# Patient Record
Sex: FEMALE | Race: WHITE | URBAN - METROPOLITAN AREA
[De-identification: names, ages, dates, MRNs, and addresses within clinical notes are randomized per-mention and may not be internally consistent; named-entity substitution may affect disease eponyms.]

---

## 2017-01-05 ENCOUNTER — TRANSFERRED RECORDS (OUTPATIENT)
Dept: HEALTH INFORMATION MANAGEMENT | Facility: CLINIC | Age: 27
End: 2017-01-05

## 2017-01-05 LAB
C TRACH DNA SPEC QL PROBE+SIG AMP: NEGATIVE
CHOLEST SERPL-MCNC: 199 MG/DL (ref 100–199)
HDLC SERPL-MCNC: 74 MG/DL
LDLC SERPL CALC-MCNC: 112 MG/DL
N GONORRHOEA DNA SPEC QL PROBE+SIG AMP: NEGATIVE
NONHDLC SERPL-MCNC: 125 MG/DL
PAP-ABSTRACT: NORMAL
SPECIMEN DESCRIP: NORMAL
SPECIMEN DESCRIPTION: NORMAL
TRIGL SERPL-MCNC: 63 MG/DL
TSH SERPL-ACNC: 1.02 UIU/ML (ref 0.35–4.94)

## 2017-10-24 ENCOUNTER — RECORDS - HEALTHEAST (OUTPATIENT)
Dept: ADMINISTRATIVE | Facility: OTHER | Age: 27
End: 2017-10-24

## 2017-10-28 ENCOUNTER — RADIANT APPOINTMENT (OUTPATIENT)
Dept: GENERAL RADIOLOGY | Facility: CLINIC | Age: 27
End: 2017-10-28
Payer: COMMERCIAL

## 2017-10-28 ENCOUNTER — OFFICE VISIT (OUTPATIENT)
Dept: URGENT CARE | Facility: URGENT CARE | Age: 27
End: 2017-10-28
Payer: COMMERCIAL

## 2017-10-28 VITALS
TEMPERATURE: 99.1 F | HEIGHT: 63 IN | DIASTOLIC BLOOD PRESSURE: 60 MMHG | SYSTOLIC BLOOD PRESSURE: 118 MMHG | BODY MASS INDEX: 24.45 KG/M2 | OXYGEN SATURATION: 100 % | HEART RATE: 81 BPM | WEIGHT: 138 LBS

## 2017-10-28 DIAGNOSIS — M25.561 ACUTE PAIN OF RIGHT KNEE: ICD-10-CM

## 2017-10-28 DIAGNOSIS — M70.41 PREPATELLAR BURSITIS OF RIGHT KNEE: ICD-10-CM

## 2017-10-28 DIAGNOSIS — M25.561 ACUTE PAIN OF RIGHT KNEE: Primary | ICD-10-CM

## 2017-10-28 PROCEDURE — 73562 X-RAY EXAM OF KNEE 3: CPT | Mod: RT

## 2017-10-28 PROCEDURE — 99203 OFFICE O/P NEW LOW 30 MIN: CPT | Performed by: FAMILY MEDICINE

## 2017-10-28 ASSESSMENT — ENCOUNTER SYMPTOMS
CONSTIPATION: 0
DIARRHEA: 0
DIZZINESS: 0
DYSURIA: 0
CHILLS: 0
HEADACHES: 0
NAUSEA: 0
SHORTNESS OF BREATH: 0
VOMITING: 0
ABDOMINAL PAIN: 0
FEVER: 0
COUGH: 0

## 2017-10-28 NOTE — NURSING NOTE
"Sejal Marks;   Chief Complaint   Patient presents with     Knee Pain     tripped and fell on right knee cap, on pavement, painful to put pressure on it and bend it      Urgent Care     Initial /60 (BP Location: Left arm, Patient Position: Chair, Cuff Size: Adult Regular)  Pulse 81  Temp 99.1  F (37.3  C) (Oral)  Ht 5' 3\" (1.6 m)  Wt 138 lb (62.6 kg)  SpO2 100%  BMI 24.45 kg/m2 Estimated body mass index is 24.45 kg/(m^2) as calculated from the following:    Height as of this encounter: 5' 3\" (1.6 m).    Weight as of this encounter: 138 lb (62.6 kg)..  BP completed using cuff size regular.  Ashley Gupta R.N.  "

## 2017-10-28 NOTE — MR AVS SNAPSHOT
After Visit Summary   10/28/2017    Sejal Marks    MRN: [id number]           Patient Information     Date Of Birth          1990        Visit Information        Provider Department      10/28/2017 6:15 PM Carlos Da Silva MD [facility]        Today's Diagnoses     Acute pain of right knee    -  1    Prepatellar bursitis of right knee          Care Instructions      Understanding Prepatellar Bursitis    A bursa is a thin, slippery, sac-like film. It contains a small amount of fluid. This structure is found between bones and soft tissues in and around joints. A bursa cushions and protects a joint. It keeps parts of a joint from rubbing against each other.  The prepatellar bursa is found on top of the kneecap (patella). It lies just under the skin. If this bursa becomes irritated and inflamed, the condition is called prepatellar bursitis.  Causes of prepatellar bursitis  A common cause of this problem is repeated kneeling on the floor. For this reason, it is sometimes called  s knee. Injury from a blow to your kneecap can also cause it. Running on uneven ground may also play a role.  Symptoms of prepatellar bursitis  These may include:    Knee pain that gets worse with bending or pressure to the knee, and gets better with rest    Swelling over the kneecap    Tenderness or warmth over the kneecap    Crackling sound from the kneecap with movement  Treatment for prepatellar bursitis  This problem often gets better with rest and medicines. Other treatments may be needed. Possible treatments include:    Resting your knee. This allows the area to heal. It includes avoiding things that trigger symptoms.    Prescription or over-the-counter pain medicines. These help reduce pain and swelling.    Stretching and strengthening exercises. These help improve the strength and flexibility of the muscles around the knee.    Cold packs or heat packs. These help  reduce pain and swelling.    Physical therapy. This may include exercises, ultrasound, or other treatments.    Kneepads. These help protect your knees during sports.    Injection of medicine into the bursa or drainage of fluid from the bursa. These may help relieve symptoms.  For symptoms that don t get better with these treatments, you may need surgery to remove the bursa.  Possible complications    If germs get into the bursa through a cut in your skin, the bursa may become infected. An infection is generally treated with antibiotic medicine. In some cases, the infected bursa must be removed.    If your knee isn t given time to heal, this problem may become long-term (chronic). This can lead to trouble moving the knee joint.     When to call your healthcare provider  Call your healthcare provider right away if you have:    Fever of 100.4 F (38 C) or higher, or as directed    Increased swelling or warmth of the area, or drainage from the area    Symptoms that don t get better or get worse    New symptoms   Date Last Reviewed: 3/10/2016    8410-7090 [employer]. [address] [place] [address]. All rights reserved. This information is not intended as a substitute for professional medical care. Always follow your healthcare professional's instructions.                Follow-ups after your visit        Who to contact     If you have questions or need follow up information about today's clinic visit or your schedule please contact [facility] directly at [phone].  Normal or non-critical lab and imaging results will be communicated to you by MyChart, letter or phone within 4 business days after the clinic has received the results. If you do not hear from us within 7 days, please contact the clinic through MyChart or phone. If you have a critical or abnormal lab result, we will notify you by phone as soon as possible.  Submit refill requests through [employer] or call your  "pharmacy and they will forward the refill request to us. Please allow 3 business days for your refill to be completed.          Additional Information About Your Visit        MyChart Information     [employer] lets you send messages to your doctor, view your test results, renew your prescriptions, schedule appointments and more. To sign up, go to [email or web address] . Click on \"Log in\" on the left side of the screen, which will take you to the Welcome page. Then click on \"Sign up Now\" on the right side of the page.     You will be asked to enter the access code listed below, as well as some personal information. Please follow the directions to create your username and password.     Your access code is: [email or web address]  Expires: 2018  7:22 PM     Your access code will  in 90 days. If you need help or a new code, please call your [place] clinic or [phone].        Care EveryWhere ID     This is your Care EveryWhere ID. This could be used by other organizations to access your [facility] medical records  [id number]        Your Vitals Were     Pulse Temperature Height Pulse Oximetry BMI (Body Mass Index)       81 99.1  F (37.3  C) (Oral) 5' 3\" (1.6 m) 100% 24.45 kg/m2        Blood Pressure from Last 3 Encounters:   10/28/17 118/60    Weight from Last 3 Encounters:   10/28/17 138 lb (62.6 kg)                 Today's Medication Changes          These changes are accurate as of: 10/28/17  7:22 PM.  If you have any questions, ask your nurse or doctor.               Start taking these medicines.        Dose/Directions    * order for DME   Used for:  Acute pain of right knee, Prepatellar bursitis of right knee   Started by:  Carlos Da Silva MD        One set of crutches   Quantity:  1 Device   Refills:  0       * order for DME   Used for:  Acute pain of right knee, Prepatellar bursitis of right knee   Started by:  Carlos Da Silva MD        One knee sleeve for right knee   Quantity:  1 Device   Refills:  0 "       * Notice:  This list has 2 medication(s) that are the same as other medications prescribed for you. Read the directions carefully, and ask your doctor or other care provider to review them with you.         Where to get your medicines      Some of these will need a paper prescription and others can be bought over the counter.  Ask your nurse if you have questions.     Bring a paper prescription for each of these medications     order for DME    order for DME                Primary Care Provider Office Phone # Fax #    Anne Ley [phone]       [facility] [address] [place] [address]        Equal Access to Services     [facility]: Hadii aad ku hadasho Soomaali, waaxda luqadaha, qaybta kaalmada aj, thang chu . So [facility] [phone].    ATENCIÓN: Si habla español, tiene a muro disposición servicios gratuitos de asistencia lingüística. [facility] [phone].    We comply with applicable federal civil rights laws and [place] laws. We do not discriminate on the basis of race, color, national origin, age, disability, sex, sexual orientation, or gender identity.            Thank you!     Thank you for choosing [facility]  for your care. Our goal is always to provide you with excellent care. Hearing back from our patients is one way we can continue to improve our services. Please take a few minutes to complete the written survey that you may receive in the mail after your visit with us. Thank you!             Your Updated Medication List - Protect others around you: Learn how to safely use, store and throw away your medicines at www.disposemymeds.org.          This list is accurate as of: 10/28/17  7:22 PM.  Always use your most recent med list.                   Brand Name Dispense Instructions for use Diagnosis    LAMOTRIGINE PO      Take 200 mg by mouth daily        * order for DME     1 Device    One set of  crutches    Acute pain of right knee, Prepatellar bursitis of right knee       * order for DME     1 Device    One knee sleeve for right knee    Acute pain of right knee, Prepatellar bursitis of right knee       ZOLOFT PO      Take 100 mg by mouth daily        * Notice:  This list has 2 medication(s) that are the same as other medications prescribed for you. Read the directions carefully, and ask your doctor or other care provider to review them with you.

## 2017-10-28 NOTE — PROGRESS NOTES
SUBJECTIVE:  Chief Complaint   Patient presents with     Knee Pain     tripped and fell on right knee cap, on pavement, painful to put pressure on it and bend it      Urgent Care     Sejal Marks is a 26 year old female presents with a chief complaint of right knee pain, swelling, tenderness, redness and decreased range of motion.  The injury occurred 3 hours(s) ago.   The injury happened while while walking. How: fall , from standing tripped on sidewalk at uneven portion immediate pain, immediate swelling, inability to bear weight directly after injury.  The patient complained of moderate pain  and has had decreased ROM.  Pain exacerbated by walking.  Relieved by rest and elevation.  She treated it initially with no therapy pt came straight here. This is the first time this type of injury has occurred to this patient.        HO fx hand and arm age 10, 14 falls/snowboarding     PMH: epilepsy on lamotrigine EC last seizure 8yrs ago, anxiety/depression on zoloft  PSH: none  Allergies: None    Meds: Lamictal 400 mg daily, Zolofot    No past medical history on file.  Current Outpatient Prescriptions   Medication Sig Dispense Refill     LAMOTRIGINE PO Take 200 mg by mouth daily       Sertraline HCl (ZOLOFT PO) Take 100 mg by mouth daily       Social History   Substance Use Topics     Smoking status: Never Smoker     Smokeless tobacco: Never Used     Alcohol use Not on file       ROS:  Review of Systems   Constitutional: Negative for chills and fever.   Respiratory: Negative for cough and shortness of breath.    Cardiovascular: Negative for chest pain.   Gastrointestinal: Negative for abdominal pain, constipation, diarrhea, nausea and vomiting.   Genitourinary: Negative for dysuria.   Musculoskeletal: Positive for joint pain.        Ant knee pain   Skin: Negative for rash.   Neurological: Negative for dizziness and headaches.     EXAM:   /60 (BP Location: Left arm, Patient Position: Chair, Cuff Size:  "Adult Regular)  Pulse 81  Temp 99.1  F (37.3  C) (Oral)  Ht 5' 3\" (1.6 m)  Wt 138 lb (62.6 kg)  SpO2 100%  BMI 24.45 kg/m2  Gen: healthy, alert, active, mild distress and healthy,alert,no distress  Extremity: knee has swelling over ant patella, point tenderness over patella and pain with flexion over anterior patella, pt able to get full extension though painful, special testing limited 2/2 pain.  .   There is not compromise to the distal circulation.  Pulses are +2 and CRT is brisk  GENERAL APPEARANCE: healthy, alert and no distress  EXTREMITIES: peripheral pulses normal  SKIN: no suspicious lesions or rashes  NEURO: Normal strength and tone, sensory exam grossly normal, mentation intact and speech normal    X-RAY was done.  No acute fracture noted with radiology read pending    ASSESSMENT:   Right Knee Pain: Likely Contusion and burstitis of the patella, significantly limiting ROM and pt having difficulty ambulating.  No fx noted on XR.  Given this knee sleeve and crutches were given for comfort.  She was encouraged to ice, take ibuprofen/tyelnol for pain and f/u in 1 week if sx worsen/do not improve.  Passive ROM was encouraged  Sejal was seen today for knee pain and urgent care.    Diagnoses and all orders for this visit:    Acute pain of right knee  -     XR Knee Right 3 Views; Future  -     order for DME; One set of crutches  -     order for DME; One knee sleeve for right knee    Prepatellar bursitis of right knee  -     order for DME; One set of crutches  -     order for DME; One knee sleeve for right knee      PLAN:  1) Rest, Ice, Compress, Elevate and Ibuprofen/tylenol q 6 hrs for 3-5 days    aCrlos Da Silva      "

## 2017-10-29 NOTE — PATIENT INSTRUCTIONS
Understanding Prepatellar Bursitis    A bursa is a thin, slippery, sac-like film. It contains a small amount of fluid. This structure is found between bones and soft tissues in and around joints. A bursa cushions and protects a joint. It keeps parts of a joint from rubbing against each other.  The prepatellar bursa is found on top of the kneecap (patella). It lies just under the skin. If this bursa becomes irritated and inflamed, the condition is called prepatellar bursitis.  Causes of prepatellar bursitis  A common cause of this problem is repeated kneeling on the floor. For this reason, it is sometimes called  s knee. Injury from a blow to your kneecap can also cause it. Running on uneven ground may also play a role.  Symptoms of prepatellar bursitis  These may include:    Knee pain that gets worse with bending or pressure to the knee, and gets better with rest    Swelling over the kneecap    Tenderness or warmth over the kneecap    Crackling sound from the kneecap with movement  Treatment for prepatellar bursitis  This problem often gets better with rest and medicines. Other treatments may be needed. Possible treatments include:    Resting your knee. This allows the area to heal. It includes avoiding things that trigger symptoms.    Prescription or over-the-counter pain medicines. These help reduce pain and swelling.    Stretching and strengthening exercises. These help improve the strength and flexibility of the muscles around the knee.    Cold packs or heat packs. These help reduce pain and swelling.    Physical therapy. This may include exercises, ultrasound, or other treatments.    Kneepads. These help protect your knees during sports.    Injection of medicine into the bursa or drainage of fluid from the bursa. These may help relieve symptoms.  For symptoms that don t get better with these treatments, you may need surgery to remove the bursa.  Possible complications    If germs get into the bursa  through a cut in your skin, the bursa may become infected. An infection is generally treated with antibiotic medicine. In some cases, the infected bursa must be removed.    If your knee isn t given time to heal, this problem may become long-term (chronic). This can lead to trouble moving the knee joint.     When to call your healthcare provider  Call your healthcare provider right away if you have:    Fever of 100.4 F (38 C) or higher, or as directed    Increased swelling or warmth of the area, or drainage from the area    Symptoms that don t get better or get worse    New symptoms   Date Last Reviewed: 3/10/2016    5211-6255 [employer]. [address] [place] [address], [place] [address]. All rights reserved. This information is not intended as a substitute for professional medical care. Always follow your healthcare professional's instructions.

## 2017-11-10 ENCOUNTER — NURSE TRIAGE (OUTPATIENT)
Dept: NURSING | Facility: CLINIC | Age: 27
End: 2017-11-10

## 2018-06-01 ENCOUNTER — OFFICE VISIT (OUTPATIENT)
Dept: FAMILY MEDICINE | Facility: CLINIC | Age: 28
End: 2018-06-01
Payer: COMMERCIAL

## 2018-06-01 VITALS
WEIGHT: 141 LBS | TEMPERATURE: 97.5 F | BODY MASS INDEX: 24.98 KG/M2 | DIASTOLIC BLOOD PRESSURE: 68 MMHG | HEART RATE: 84 BPM | SYSTOLIC BLOOD PRESSURE: 109 MMHG | RESPIRATION RATE: 18 BRPM | HEIGHT: 63 IN

## 2018-06-01 DIAGNOSIS — F33.0 MAJOR DEPRESSIVE DISORDER, RECURRENT EPISODE, MILD (H): ICD-10-CM

## 2018-06-01 DIAGNOSIS — Z00.00 WELL FEMALE EXAM WITHOUT GYNECOLOGICAL EXAM: Primary | ICD-10-CM

## 2018-06-01 PROBLEM — Z13.6 CARDIOVASCULAR SCREENING; LDL GOAL LESS THAN 160: Status: ACTIVE | Noted: 2018-06-01

## 2018-06-01 PROCEDURE — 99385 PREV VISIT NEW AGE 18-39: CPT | Performed by: NURSE PRACTITIONER

## 2018-06-01 RX ORDER — ALPRAZOLAM 0.5 MG
0.5 TABLET ORAL
COMMUNITY
Start: 2018-01-25

## 2018-06-01 RX ORDER — SERTRALINE HYDROCHLORIDE 100 MG/1
100 TABLET, FILM COATED ORAL DAILY
Qty: 90 TABLET | Refills: 3 | Status: SHIPPED | OUTPATIENT
Start: 2018-06-01

## 2018-06-01 NOTE — PROGRESS NOTES
SUBJECTIVE:   CC: Sejal Marks is an 27 year old woman who presents for preventive health visit.     Physical   Annual:     Getting at least 3 servings of Calcium per day::  Yes    Bi-annual eye exam::  NO    Dental care twice a year::  NO    Sleep apnea or symptoms of sleep apnea::  None    Diet::  Vegetarian/vegan    Frequency of exercise::  2-3 days/week    Duration of exercise::  30-45 minutes    Taking medications regularly::  Yes    Medication side effects::  None    Additional concerns today::  No            Today's PHQ-2 Score:   PHQ-2 ( 1999 [employer]) 6/1/2018   Q1: Little interest or pleasure in doing things 1   Q2: Feeling down, depressed or hopeless 0   PHQ-2 Score 1   Q1: Little interest or pleasure in doing things Several days   Q2: Feeling down, depressed or hopeless Not at all   PHQ-2 Score 1       Abuse: Current or Past(Physical, Sexual or Emotional)- No  Do you feel safe in your environment - Yes    Social History   Substance Use Topics     Smoking status: Never Smoker     Smokeless tobacco: Never Used     Alcohol use Not on file     Alcohol Use 6/1/2018   If you drink alcohol do you typically have greater than 3 drinks per day OR greater than 7 drinks per week? No     Reviewed orders with patient.  Reviewed health maintenance and updated orders accordingly - Yes    Mammogram not appropriate for this patient based on age.    Pertinent mammograms are reviewed under the imaging tab.  History of abnormal Pap smear: NO - age 21-29 PAP every 3 years recommended    Reviewed and updated as needed this visit by clinical staff  Allergies  Meds  Problems  Med Hx  Surg Hx  Fam Hx         Reviewed and updated as needed this visit by Provider  Allergies  Meds  Problems  Med Hx  Surg Hx  Fam Hx            Review of Systems  CONSTITUTIONAL: NEGATIVE for fever, chills, change in weight  INTEGUMENTARU/SKIN: NEGATIVE for worrisome rashes, moles or lesions  EYES: NEGATIVE for vision changes  "or irritation  ENT: NEGATIVE for ear, mouth and throat problems  RESP: NEGATIVE for significant cough or SOB  BREAST: NEGATIVE for masses, tenderness or discharge  CV: NEGATIVE for chest pain, palpitations or peripheral edema  GI: NEGATIVE for nausea, abdominal pain, heartburn, or change in bowel habits  : NEGATIVE for unusual urinary or vaginal symptoms. Periods are regular.  MUSCULOSKELETAL: NEGATIVE for significant arthralgias or myalgia  NEURO: NEGATIVE for weakness, dizziness or paresthesias  PSYCHIATRIC: NEGATIVE for changes in mood or affect     OBJECTIVE:   /68  Pulse 84  Temp 97.5  F (36.4  C) (Oral)  Resp 18  Ht 5' 3.25\" (1.607 m)  Wt 141 lb (64 kg)  LMP 05/19/2018 (Exact Date)  BMI 24.78 kg/m2  Physical Exam  GENERAL: healthy, alert and no distress  EYES: Eyes grossly normal to inspection, PERRL and conjunctivae and sclerae normal  HENT: ear canals and TM's normal, nose and mouth without ulcers or lesions  NECK: no adenopathy, no asymmetry, masses, or scars and thyroid normal to palpation  RESP: lungs clear to auscultation - no rales, rhonchi or wheezes  BREAST: normal without masses, tenderness or nipple discharge and no palpable axillary masses or adenopathy  CV: regular rate and rhythm, normal S1 S2, no S3 or S4, no murmur, click or rub, no peripheral edema and peripheral pulses strong  ABDOMEN: soft, nontender, no hepatosplenomegaly, no masses and bowel sounds normal  MS: no gross musculoskeletal defects noted, no edema  SKIN: no suspicious lesions or rashes  NEURO: Normal strength and tone, mentation intact and speech normal  PSYCH: mentation appears normal, affect normal/bright  PHQ +3/27    ASSESSMENT/PLAN:       ICD-10-CM    1. Well female exam without gynecological exam Z00.00    2. Major depressive disorder, recurrent episode, mild (H) F33.0 sertraline (ZOLOFT) 100 MG tablet      well female, not fasting for labs.  Encouraged to continue exercise and healthy diet choices.  " "    Depression stable on sertraline 100 mg daily.  F/u 1 year or sooner if worsening   COUNSELING:  Reviewed preventive health counseling, as reflected in patient instructions         reports that she has never smoked. She has never used smokeless tobacco.    Estimated body mass index is 24.45 kg/(m^2) as calculated from the following:    Height as of 10/28/17: 5' 3\" (1.6 m).    Weight as of 10/28/17: 138 lb (62.6 kg).       Counseling Resources:  ATP IV Guidelines  Pooled Cohorts Equation Calculator  Breast Cancer Risk Calculator  FRAX Risk Assessment  ICSI Preventive Guidelines  Dietary Guidelines for Americans, 2010  USDA's MyPlate  ASA Prophylaxis  Lung CA Screening    RADHA Jason CNP  [facility]  Answers for HPI/ROS submitted by the patient on 6/1/2018   PHQ-2 Score: 1    "

## 2018-06-01 NOTE — MR AVS SNAPSHOT
After Visit Summary   6/1/2018    Sejal Marks    MRN: [id number]           Patient Information     Date Of Birth          1990        Visit Information        Provider Department      6/1/2018 10:40 AM Naomi Gray APRN CNP [facility]        Today's Diagnoses     Well female exam without gynecological exam    -  1    Major depressive disorder, recurrent episode, mild (H)          Care Instructions      Preventive Health Recommendations  Female Ages 18 to 25     Yearly exam:     See your health care provider every year in order to  o Review health changes.   o Discuss preventive care.    o Review your medicines if your doctor has prescribed any.      You should be tested each year for STDs (sexually transmitted diseases).       After age 20, talk to your provider about how often you should have cholesterol testing.      Starting at age 21, get a Pap test every three years. If you have an abnormal result, your doctor may have you test more often.      If you are at risk for diabetes, you should have a diabetes test (fasting glucose).     Shots:     Get a flu shot each year.     Get a tetanus shot every 10 years.     Consider getting the shot (vaccine) that prevents cervical cancer (Gardasil).    Nutrition:     Eat at least 5 servings of fruits and vegetables each day.    Eat whole-grain bread, whole-wheat pasta and brown rice instead of white grains and rice.    Talk to your provider about Calcium and Vitamin D.     Lifestyle    Exercise at least 150 minutes a week each week (30 minutes a day, 5 days a week). This will help you control your weight and prevent disease.    Limit alcohol to one drink per day.    No smoking.     Wear sunscreen to prevent skin cancer.    See your dentist every six months for an exam and cleaning.          Follow-ups after your visit        Who to contact     If you have questions or need follow up information about today's  "clinic visit or your schedule please contact [facility] directly at [phone].  Normal or non-critical lab and imaging results will be communicated to you by MyChart, letter or phone within 4 business days after the clinic has received the results. If you do not hear from us within 7 days, please contact the clinic through [employer] or phone. If you have a critical or abnormal lab result, we will notify you by phone as soon as possible.  Submit refill requests through [employer] or call your pharmacy and they will forward the refill request to us. Please allow 3 business days for your refill to be completed.          Additional Information About Your Visit        MyChart Information     [employer] lets you send messages to your doctor, view your test results, renew your prescriptions, schedule appointments and more. To sign up, go to [email or web address] . Click on \"Log in\" on the left side of the screen, which will take you to the Welcome page. Then click on \"Sign up Now\" on the right side of the page.     You will be asked to enter the access code listed below, as well as some personal information. Please follow the directions to create your username and password.     Your access code is: [email or web address]  Expires: 2018 11:05 AM     Your access code will  in 90 days. If you need help or a new code, please call your [place] clinic or [phone].        Care EveryWhere ID     This is your Care EveryWhere ID. This could be used by other organizations to access your [place] medical records  [id number]        Your Vitals Were     Pulse Temperature Respirations Height Last Period BMI (Body Mass Index)    84 97.5  F (36.4  C) (Oral) 18 5' 3.25\" (1.607 m) 2018 (Exact Date) 24.78 kg/m2       Blood Pressure from Last 3 Encounters:   18 109/68   10/28/17 118/60    Weight from Last 3 Encounters:   18 141 lb (64 kg)   10/28/17 138 lb (62.6 kg)              Today, you had the " following     No orders found for display         Today's Medication Changes          These changes are accurate as of 6/1/18 11:05 AM.  If you have any questions, ask your nurse or doctor.               These medicines have changed or have updated prescriptions.        Dose/Directions    sertraline 100 MG tablet   Commonly known as:  ZOLOFT   This may have changed:  medication strength   Used for:  Major depressive disorder, recurrent episode, mild (H)   Changed by:  Naomi Gray APRN CNP        Dose:  100 mg   Take 1 tablet (100 mg) by mouth daily   Quantity:  90 tablet   Refills:  3            Where to get your medicines      These medications were sent to [facility] - [place], [place] - [address]  [address] [facility] [address]     Phone:  [phone]     sertraline 100 MG tablet                Primary Care Provider Office Phone # Fax #    Anne Ley [phone]       [facility] [address] [place] [address]        Equal Access to Services     ALEXIS BARTH AH: Hadii aad ku hadasho Soomaali, waaxda luqadaha, qaybta kaalmada adeegyada, waxay idiin haymamadou chu . So [facility] [phone].    ATENCIÓN: Si habla español, tiene a muro disposición servicios gratuitos de asistencia lingüística. [facility] [phone].    We comply with applicable federal civil rights laws and [place] laws. We do not discriminate on the basis of race, color, national origin, age, disability, sex, sexual orientation, or gender identity.            Thank you!     Thank you for choosing [facility]  for your care. Our goal is always to provide you with excellent care. Hearing back from our patients is one way we can continue to improve our services. Please take a few minutes to complete the written survey that you may receive in the mail after your visit with us. Thank you!             Your Updated Medication List - Protect others around  you: Learn how to safely use, store and throw away your medicines at www.disposemymeds.org.          This list is accurate as of 6/1/18 11:05 AM.  Always use your most recent med list.                   Brand Name Dispense Instructions for use Diagnosis    ALPRAZolam 0.5 MG tablet    XANAX     Take 0.5 mg by mouth        LAMOTRIGINE PO      Take 200 mg by mouth daily        sertraline 100 MG tablet    ZOLOFT    90 tablet    Take 1 tablet (100 mg) by mouth daily    Major depressive disorder, recurrent episode, mild (H)

## 2018-06-01 NOTE — Clinical Note
Please abstract the following data from this visit with this patient into the appropriate field in Epic:  Pap smear done on this date: 2017 (approximately), by this group: [facility], results were normal .

## 2018-06-28 ENCOUNTER — OFFICE VISIT (OUTPATIENT)
Dept: OBGYN | Facility: CLINIC | Age: 28
End: 2018-06-28
Attending: OBSTETRICS & GYNECOLOGY
Payer: COMMERCIAL

## 2018-06-28 VITALS
BODY MASS INDEX: 25.48 KG/M2 | SYSTOLIC BLOOD PRESSURE: 116 MMHG | WEIGHT: 143.8 LBS | DIASTOLIC BLOOD PRESSURE: 76 MMHG | HEART RATE: 105 BPM | HEIGHT: 63 IN

## 2018-06-28 DIAGNOSIS — Z30.430 ENCOUNTER FOR IUD INSERTION: Primary | ICD-10-CM

## 2018-06-28 DIAGNOSIS — Z01.812 PRE-PROCEDURAL LABORATORY EXAMINATION: ICD-10-CM

## 2018-06-28 DIAGNOSIS — Z30.432 ENCOUNTER FOR IUD REMOVAL: ICD-10-CM

## 2018-06-28 LAB
HCG UR QL: NEGATIVE
INTERNAL QC OK POCT: YES

## 2018-06-28 PROCEDURE — 25000125 ZZHC RX 250: Mod: ZF

## 2018-06-28 PROCEDURE — G0463 HOSPITAL OUTPT CLINIC VISIT: HCPCS | Mod: ZF

## 2018-06-28 PROCEDURE — 58301 REMOVE INTRAUTERINE DEVICE: CPT | Mod: ZF | Performed by: OBSTETRICS & GYNECOLOGY

## 2018-06-28 PROCEDURE — 58300 INSERT INTRAUTERINE DEVICE: CPT | Mod: ZF | Performed by: OBSTETRICS & GYNECOLOGY

## 2018-06-28 PROCEDURE — 40000269 ZZH STATISTIC NO CHARGE FACILITY FEE

## 2018-06-28 PROCEDURE — 81025 URINE PREGNANCY TEST: CPT | Mod: ZF | Performed by: OBSTETRICS & GYNECOLOGY

## 2018-06-28 NOTE — MR AVS SNAPSHOT
After Visit Summary   6/28/2018    Sejal Marks    MRN: [id number]           Patient Information     Date Of Birth          1990        Visit Information        Provider Department      6/28/2018 10:00 AM Gemma Pérez MD [facility]        Today's Diagnoses     Encounter for IUD insertion    -  1    Pre-procedural laboratory examination        Encounter for IUD removal          Care Instructions      IUD pamphlet reviewed and given to patient.          Follow-ups after your visit        Your next 10 appointments already scheduled     Jul 26, 2018 10:00 AM CDT   Return Visit with Gemma Pérez MD   [facility] ([facility])    [place] [facility] [address]  [address] [facility] [id number] [phone]            Aug 07, 2018 11:40 AM CDT   (Arrive by 11:25 AM)   NEW FOOT/ANKLE with Bertin Espitia [facility] ([facility])    [address] [facility]  [address] [facility] [address] [phone]              Who to contact     Please call your clinic at [phone] to:    Ask questions about your health    Make or cancel appointments    Discuss your medicines    Learn about your test results    Speak to your doctor            Additional Information About Your Visit        MyChart Information     PROSimityt is an electronic gateway that provides easy, online access to your medical records. With luma-id, you can request a clinic appointment, read your test results, renew a prescription or communicate with your care team.     To sign up for [employer]imityt visit the website at [email or web address]   You will be asked to enter the access code listed below, as well as some personal information. Please follow the directions to create your username and password.     Your access code is: [email or web address]  Expires: 8/30/2018 11:05 AM    "  Your access code will  in 90 days. If you need help or a new code, please contact your [facility] or call [phone] for assistance.        Care EveryWhere ID     This is your Care EveryWhere ID. This could be used by other organizations to access your [facility] medical records  [id number]        Your Vitals Were     Pulse Height Last Period BMI (Body Mass Index)          105 1.6 m (5' 3\") 06/15/2018 25.47 kg/m2         Blood Pressure from Last 3 Encounters:   No data found for BP    Weight from Last 3 Encounters:   No data found for Wt              Today, you had the following     No orders found for display         Today's Medication Changes          These changes are accurate as of 18 11:59 PM.  If you have any questions, ask your nurse or doctor.               Start taking these medicines.        Dose/Directions    levonorgestrel 20 MCG/24HR IUD   Commonly known as:  MIRENA (52 MG)   Used for:  Encounter for IUD insertion   Started by:  Gemma Pérez MD        Dose:  1 each   1 each (20 mcg) by Intrauterine route once for 1 dose   Quantity:  1 each   Refills:  0            Where to get your medicines      Some of these will need a paper prescription and others can be bought over the counter.  Ask your nurse if you have questions.     You don't need a prescription for these medications     levonorgestrel 20 MCG/24HR IUD                Primary Care Provider Office Phone # Fax #    Anne Ley [phone]       [facility] [address] [place] [address]        Equal Access to Services     [facility] LARY AH: Hadii nini ku hadasho Soomaali, waaxda luqadaha, qaybta kaalmada adeegyada, thang ramirez. So [facility] [phone].    ATENCIÓN: Si habla español, tiene a muro disposición servicios gratuitos de asistencia lingüística. Llame al [phone].    We comply with applicable federal civil rights laws " and [place] laws. We do not discriminate on the basis of race, color, national origin, age, disability, sex, sexual orientation, or gender identity.            Thank you!     Thank you for choosing [facility]  for your care. Our goal is always to provide you with excellent care. Hearing back from our patients is one way we can continue to improve our services. Please take a few minutes to complete the written survey that you may receive in the mail after your visit with us. Thank you!             Your Updated Medication List - Protect others around you: Learn how to safely use, store and throw away your medicines at www.disposemymeds.org.          This list is accurate as of 6/28/18 11:59 PM.  Always use your most recent med list.                   Brand Name Dispense Instructions for use Diagnosis    ALPRAZolam 0.5 MG tablet    XANAX     Take 0.5 mg by mouth        LAMOTRIGINE PO      Take 200 mg by mouth daily        levonorgestrel 20 MCG/24HR IUD    MIRENA (52 MG)    1 each    1 each (20 mcg) by Intrauterine route once for 1 dose    Encounter for IUD insertion       sertraline 100 MG tablet    ZOLOFT    90 tablet    Take 1 tablet (100 mg) by mouth daily    Major depressive disorder, recurrent episode, mild (H)

## 2018-06-28 NOTE — LETTER
"2018     RE: Sejal Marks  [address] [place] [address]     Dear Colleague,    Thank you for referring your patient, Sejal Marks, to the [facility] at [facility]. Please see a copy of my visit note below.    SUBJECTIVE: Sejal Marks is a 27 year old G0 with hx of anemia, requests for Paragard IUD removal and Mirena IUD insertion. Her menstrual cycle was always regular. However, the menstruations lasted a lot longer and got a lot heavier with the Paragard. She prefers the Mirena IUD for bleeding control    Verification of Procedure:  Just prior the procedure through verbal and active participation of team members, I verified:     Initials   Patient Name MMMA   Patient  MMMA   Procedure to be performed MMMA     Consent:  Risks, benefits of treatment, and alternative options for contraception were discussed.  Patient's questions were elicited and answered.  Written consent was obtained and scanned into medical record.     OBJECTIVE: /76 (BP Location: Left arm, Patient Position: Chair)  Pulse 105  Ht 1.6 m (5' 3\")  Wt 65.2 kg (143 lb 12.8 oz)  LMP 06/15/2018  BMI 25.47 kg/m2    Pelvic Exam:  EG/BUS: Normal genital architecture without lesions, erythema or abnormal secretions. Bartholin's, Urethra, [place]'s glands are normal.   Vagina: moist, pink, rugae with creamy, white and odorlesssecretions  Cervix: no lesions  Uterus: anteverted,    Adnexa: Within normal limits and No masses, nodularity, tenderness  Rectum: anus normal    PROCEDURE NOTE  --  ParaGard Removal and Mirena Re-Insertion    Reason for Removal and Re-insertion:  menorrhagia  Pregnancy test: Negative    Counseling:  Patient counseled on efficacy, benefits, risks, potential side effects of IUD.  Insertion procedure and risk of infection, perforation, and spontaneous expulsion reviewed.   Advised to plan removal and/or replacement of " IUD in 5 years from today or when desired.       Sejal was pre-medicated with 600mg ibuprofen prior to beginning the procedure.      ParaGard IUD removal procedure:    Cervix was visualized with a medium Graves speculum and prepped with. The strings were grasped with a uterine packing forceps and the IUD removed with gentle traction.  No resistance was encountered.  The ParaGard IUD was noted to be intact.  There were no complications.  Sejal Marks reported no pain.  There was no bleeding.     Re-insertion procedure  Under sterile technique, cervix was visualized with a medium Sierra speculum and prepped with Betadine solution. The uterus was sounded to 7 cm. The Mirena IUD insertion apparatus was prepared and placed through the cervix without significant resistance and deployed at the fundus in the usual fashion. The strings were trimmed 3 cm from the external os.      Device Lot #: [id number]   Device Expiration Date: Jan 2021     EBL:  Minimal     Complications: None      Sejal Marks tolerated procedure well.    PLAN:    Written information on IUD use reviewed and given.  Symptoms to report reviewed. To report heavy bleeding, severe cramping, or abnormal vaginal discharge.  May take Ibuprofen 400-800 mg PO TID PRN or Naproxen 500 mg PO BID for cramping. Reminded to check for IUD strings every month.  Patient has been counseled to use backup birth control method for 1 week.  Return to clinic in 4-6 weeks for string check. Sejal VAN Felipa Marks verbalized understanding of instructions.    Toshia Pope MD  [facility] OBGYN PGY-1  6/28/2018    Again, thank you for allowing me to participate in the care of your patient.      Sincerely,    Gemma Pérez MD

## 2018-06-28 NOTE — PROGRESS NOTES
"    SUBJECTIVE: Sejal Marks is a 27 year old G0 with hx of anemia, requests for Paragard IUD removal and Mirena IUD insertion. Her menstrual cycle was always regular. However, the menstruations lasted a lot longer and got a lot heavier with the Paragard. She prefers the Mirena IUD for bleeding control    Verification of Procedure:  Just prior the procedure through verbal and active participation of team members, I verified:     Initials   Patient Name MMMA   Patient  MMMA   Procedure to be performed MMMA     Consent:  Risks, benefits of treatment, and alternative options for contraception were discussed.  Patient's questions were elicited and answered.  Written consent was obtained and scanned into medical record.     OBJECTIVE: /76 (BP Location: Left arm, Patient Position: Chair)  Pulse 105  Ht 1.6 m (5' 3\")  Wt 65.2 kg (143 lb 12.8 oz)  LMP 06/15/2018  BMI 25.47 kg/m2    Pelvic Exam:  EG/BUS: Normal genital architecture without lesions, erythema or abnormal secretions. Bartholin's, Urethra, [place]'s glands are normal.   Vagina: moist, pink, rugae with creamy, white and odorlesssecretions  Cervix: no lesions  Uterus: anteverted,    Adnexa: Within normal limits and No masses, nodularity, tenderness  Rectum: anus normal      PROCEDURE NOTE  --  ParaGard Removal and Mirena Re-Insertion    Reason for Removal and Re-insertion:  menorrhagia  Pregnancy test: Negative    Counseling:  Patient counseled on efficacy, benefits, risks, potential side effects of IUD.  Insertion procedure and risk of infection, perforation, and spontaneous expulsion reviewed.   Advised to plan removal and/or replacement of IUD in 5 years from today or when desired.       Sejal was pre-medicated with 600mg ibuprofen prior to beginning the procedure.        ParaGard IUD removal procedure:    Cervix was visualized with a medium Graves speculum and prepped with. The strings were grasped with a uterine packing forceps and " the IUD removed with gentle traction.  No resistance was encountered.  The ParaGard IUD was noted to be intact.  There were no complications.  Sejal Marks reported no pain.  There was no bleeding.     Re-insertion procedure  Under sterile technique, cervix was visualized with a medium Sierra speculum and prepped with Betadine solution. The uterus was sounded to 7 cm. The Mirena IUD insertion apparatus was prepared and placed through the cervix without significant resistance and deployed at the fundus in the usual fashion. The strings were trimmed 3 cm from the external os.      Device Lot #: [id number]   Device Expiration Date: Jan 2021     EBL:  Minimal     Complications: None      Sejal Marks tolerated procedure well.    PLAN:      Written information on IUD use reviewed and given.  Symptoms to report reviewed. To report heavy bleeding, severe cramping, or abnormal vaginal discharge.  May take Ibuprofen 400-800 mg PO TID PRN or Naproxen 500 mg PO BID for cramping. Reminded to check for IUD strings every month.  Patient has been counseled to use backup birth control method for 1 week.  Return to clinic in 4-6 weeks for string check. Sejal VAN Felipashreya Marks verbalized understanding of instructions.    Toshia Pope MD  [facility] PGY-1  6/28/2018    The patient was seen and examined with Dr. Pope.  I was present for the exam and procedures.  I have reviewed and edited the above note.    Gemma Pérez MD, FACOG

## 2018-06-28 NOTE — NURSING NOTE
Chief Complaint   Patient presents with     IUD     Mirena     Follow [facility]     [facility]

## 2018-07-26 ENCOUNTER — OFFICE VISIT (OUTPATIENT)
Dept: OBGYN | Facility: CLINIC | Age: 28
End: 2018-07-26
Attending: OBSTETRICS & GYNECOLOGY
Payer: COMMERCIAL

## 2018-07-26 VITALS — HEART RATE: 74 BPM | HEIGHT: 63 IN | DIASTOLIC BLOOD PRESSURE: 74 MMHG | SYSTOLIC BLOOD PRESSURE: 108 MMHG

## 2018-07-26 DIAGNOSIS — Z30.431 IUD CHECK UP: Primary | ICD-10-CM

## 2018-07-26 NOTE — LETTER
"7/26/2018       RE: Sejal Marks  [address] [place] [address]     Dear Colleague,    Thank you for referring your patient, Sejal Marks, to the [facility] at [facility]. Please see a copy of my visit note below.    S:  Pt return today for an IUD check.   She had a Mirena IUD placed about 4 weeks ago.  She is still having some spotting but is overall happy with her IUD so far.     O: /74  Pulse 74  Ht 1.6 m (5' 3\")  LMP 07/17/2018 (Exact Date)    gen-pleasant, NAD  Pelvic Exam:  Vulva: No external lesions, normal hair distribution, no adenopathy  Vagina: Moist, pink, no abnormal discharge, well rugated, no lesions  Cervix: smooth, pink, no visible lesions, IUD strings present at the external os  Uterus: Normal size, anteverted, non-tender, mobile, tip of IUD not palpated    A:  Intrauterine Mirena IUD    Plan:  RTC 1 year for annual exam or as needed.      Again, thank you for allowing me to participate in the care of your patient.      Sincerely,    Gemma Pérez MD      "

## 2018-07-26 NOTE — PROGRESS NOTES
"S:  Pt return today for an IUD check.   She had a Mirena IUD placed about 4 weeks ago.  She is still having some spotting but is overall happy with her IUD so far.     O: /74  Pulse 74  Ht 1.6 m (5' 3\")  LMP 07/17/2018 (Exact Date)    gen-pleasant, NAD  Pelvic Exam:  Vulva: No external lesions, normal hair distribution, no adenopathy  Vagina: Moist, pink, no abnormal discharge, well rugated, no lesions  Cervix: smooth, pink, no visible lesions, IUD strings present at the external os  Uterus: Normal size, anteverted, non-tender, mobile, tip of IUD not palpated    A:  Intrauterine Mirena IUD    Plan:  RTC 1 year for annual exam or as needed.    Gemma Pérez MD, FACOG    "

## 2018-07-26 NOTE — MR AVS SNAPSHOT
"              After Visit Summary   2018    Sejal Marks    MRN: [id number]           Patient Information     Date Of Birth          1990        Visit Information        Provider Department      2018 10:00 AM Gemma Pérez MD [facility]        Today's Diagnoses     IUD check up    -  1       Follow-ups after your visit        Your next 10 appointments already scheduled     Aug 07, 2018 11:40 AM CDT   (Arrive by 11:25 AM)   NEW FOOT/ANKLE with Bertin Espitia [facility] ([facility])    [address] [place] [id number] [phone]              Who to contact     Please call your clinic at [phone] to:    Ask questions about your health    Make or cancel appointments    Discuss your medicines    Learn about your test results    Speak to your doctor            Additional Information About Your Visit        MyChart Information     AudioTagt is an electronic gateway that provides easy, online access to your medical records. With Waddle, you can request a clinic appointment, read your test results, renew a prescription or communicate with your care team.     To sign up for AudioTagt visit the website at [email or web address]   You will be asked to enter the access code listed below, as well as some personal information. Please follow the directions to create your username and password.     Your access code is: [email or web address]  Expires: 2018 11:05 AM     Your access code will  in 90 days. If you need help or a new code, please contact your [facility] or call [phone] for assistance.        Care EveryWhere ID     This is your Care EveryWhere ID. This could be used by other organizations to access your [facility] medical records  [id number]        Your Vitals Were     Pulse Height Last Period             74 1.6 m (5' 3\") 2018 " (Exact Date)          Blood Pressure from Last 3 Encounters:   07/26/18 108/74   06/28/18 116/76   06/01/18 109/68    Weight from Last 3 Encounters:   06/28/18 65.2 kg (143 lb 12.8 oz)   06/01/18 64 kg (141 lb)   10/28/17 62.6 kg (138 lb)              Today, you had the following     No orders found for display       Primary Care Provider Office Phone # Fax #    Anne Ley [phone]       [facility] [address] [place] [address]        Equal Access to Services     [facility] CenterLARS : Hadii nini nguyễn Sowilian, wasandy lopez, qaybta kaalmada aj, thang chu . So [facility] [phone].    ATENCIÓN: Si habla español, tiene a muro disposición servicios gratuitos de asistencia lingüística. [place] [phone].    We comply with applicable federal civil rights laws and [place] laws. We do not discriminate on the basis of race, color, national origin, age, disability, sex, sexual orientation, or gender identity.            Thank you!     Thank you for choosing [facility]  for your care. Our goal is always to provide you with excellent care. Hearing back from our patients is one way we can continue to improve our services. Please take a few minutes to complete the written survey that you may receive in the mail after your visit with us. Thank you!             Your Updated Medication List - Protect others around you: Learn how to safely use, store and throw away your medicines at www.disposemymeds.org.          This list is accurate as of 7/26/18 10:25 AM.  Always use your most recent med list.                   Brand Name Dispense Instructions for use Diagnosis    ALPRAZolam 0.5 MG tablet    XANAX     Take 0.5 mg by mouth        LAMOTRIGINE PO      Take 200 mg by mouth daily        levonorgestrel 20 MCG/24HR IUD    MIRENA (52 MG)    1 each    1 each (20 mcg) by Intrauterine route once for 1 dose    Encounter for IUD insertion        sertraline 100 MG tablet    ZOLOFT    90 tablet    Take 1 tablet (100 mg) by mouth daily    Major depressive disorder, recurrent episode, mild (H)

## 2018-08-07 ENCOUNTER — OFFICE VISIT (OUTPATIENT)
Dept: ORTHOPEDICS | Facility: CLINIC | Age: 28
End: 2018-08-07
Payer: COMMERCIAL

## 2018-08-07 DIAGNOSIS — M79.671 PAIN IN BOTH FEET: ICD-10-CM

## 2018-08-07 DIAGNOSIS — Q66.70 PES CAVUS: Primary | ICD-10-CM

## 2018-08-07 DIAGNOSIS — M79.672 PAIN IN BOTH FEET: ICD-10-CM

## 2018-08-07 RX ORDER — LAMOTRIGINE 200 MG/1
TABLET, EXTENDED RELEASE ORAL
Refills: 11 | COMMUNITY
Start: 2018-07-31

## 2018-08-07 NOTE — LETTER
8/7/2018       RE: Sejal Marks  [address] [place] [address]     Dear Colleague,    Thank you for referring your patient, Sejal Marks, [facility] Please see a copy of my visit note below.    Date of Service: 8/7/2018    Chief Complaint:   Chief Complaint   Patient presents with     Consult     Pain, bilateral foot. Pt stated that the bottoms of her feet hurt. Pt stated that she was seen for plantar fasciitis back in [facility]. Pt stated that this is unrelated. Hx of sprained ankles.         HPI: Sejal is a 27 year old female who presents today for further evaluation of BL foot pain.  Nature: dull/aching    Location: entire bottom of both feet    Duration: 1 year    Onset: gradual. No inciting trauma    Course: worsening.     Aggravating/alleviating factors: walking long distances and standing for long periods aggravate. Massage, stretch, and rest alleviate.     Previous Treatments: Stretch. Resting. OTC orthotics have helped some. Got new shoes which are also helpful.        PMH:   Past Medical History:   Diagnosis Date     Anxiety      Depression      Epilepsy (H)        PSxH:   Past Surgical History:   Procedure Laterality Date     NO HISTORY OF SURGERY         Allergies: Review of patient's allergies indicates no known allergies.    SH:   Social History     Social History     Marital status: Single     Spouse name: N/A     Number of children: N/A     Years of education: N/A     Occupational History     Not on file.     Social History Main Topics     Smoking status: Never Smoker     Smokeless tobacco: Never Used     Alcohol use Yes     Drug use: Yes     Special: Marijuana     Sexual activity: Yes     Birth control/ protection: IUD     Other Topics Concern     Parent/Sibling W/ Cabg, Mi Or Angioplasty Before 65f 55m? No     Social History Narrative       FH:   Family History   Problem Relation Age of  Onset     HEART DISEASE Mother        Objective:  Data Unavailable Data Unavailable Data Unavailable Data Unavailable Data Unavailable 0 lbs 0 oz    PT and DP pulses are 2/4 bilaterally. CRT is 3 seconds. Positive pedal hair.   Gross sensation is intact bilaterally.   Equinus is mild bilaterally. No pain with active or passive ROM of the ankle, MTJ, 1st ray, or halluces bilaterally,.  Some discomfort is noted with palpation of the entire plantar aspect of the bilateral feet.  There is some pain noted with palpation of the medial band of the plantar fascia as it attaches at the calcaneus.  No pain with palpation of the plantar aspect of the metatarsal heads.  No pain with MTPJ range of motion's.  Foot is in slightly cavus position.  Nails normal bilaterally. No open lesions are noted.     Assessment: Pes cavus with intrinsic muscle fatigue causing pain.     Plan:  - Pt seen and evaluated.  - Recommend that she try a pair of custom orthotics. She agrees to this. These were molded and sent to the lab. Will invert the mid and top layers for more comfort.   - She is moving to [place] in a month. Recommend following up with [facility] if needed.                Again, thank you for allowing me to participate in the care of your patient.      Sincerely,    Bertin Espitia DPM

## 2018-08-07 NOTE — PROGRESS NOTES
Date of Service: 8/7/2018    Chief Complaint:   Chief Complaint   Patient presents with     Consult     Pain, bilateral foot. Pt stated that the bottoms of her feet hurt. Pt stated that she was seen for plantar fasciitis back in [facility]. Pt stated that this is unrelated. Hx of sprained ankles.         HPI: Sejal is a 27 year old female who presents today for further evaluation of BL foot pain.  Nature: dull/aching    Location: entire bottom of both feet    Duration: 1 year    Onset: gradual. No inciting trauma    Course: worsening.     Aggravating/alleviating factors: walking long distances and standing for long periods aggravate. Massage, stretch, and rest alleviate.     Previous Treatments: Stretch. Resting. OTC orthotics have helped some. Got new shoes which are also helpful.      Review of Systems: Answers for HPI/ROS submitted by the patient on 8/7/2018   General Symptoms: No  Skin Symptoms: No  HENT Symptoms: No  EYE SYMPTOMS: No  HEART SYMPTOMS: No  LUNG SYMPTOMS: No  INTESTINAL SYMPTOMS: No  URINARY SYMPTOMS: No  GYNECOLOGIC SYMPTOMS: No  BREAST SYMPTOMS: No  SKELETAL SYMPTOMS: No  BLOOD SYMPTOMS: No  NERVOUS SYSTEM SYMPTOMS: No  MENTAL HEALTH SYMPTOMS: No      PMH:   Past Medical History:   Diagnosis Date     Anxiety      Depression      Epilepsy (H)        PSxH:   Past Surgical History:   Procedure Laterality Date     NO HISTORY OF SURGERY         Allergies: Review of patient's allergies indicates no known allergies.    SH:   Social History     Social History     Marital status: Single     Spouse name: N/A     Number of children: N/A     Years of education: N/A     Occupational History     Not on file.     Social History Main Topics     Smoking status: Never Smoker     Smokeless tobacco: Never Used     Alcohol use Yes     Drug use: Yes     Special: Marijuana     Sexual activity: Yes     Birth control/ protection: IUD     Other Topics Concern     Parent/Sibling W/ Cabg, Mi Or Angioplasty Before 65f  55m? No     Social History Narrative       FH:   Family History   Problem Relation Age of Onset     HEART DISEASE Mother        Objective:  Data Unavailable Data Unavailable Data Unavailable Data Unavailable Data Unavailable 0 lbs 0 oz    PT and DP pulses are 2/4 bilaterally. CRT is 3 seconds. Positive pedal hair.   Gross sensation is intact bilaterally.   Equinus is mild bilaterally. No pain with active or passive ROM of the ankle, MTJ, 1st ray, or halluces bilaterally,.  Some discomfort is noted with palpation of the entire plantar aspect of the bilateral feet.  There is some pain noted with palpation of the medial band of the plantar fascia as it attaches at the calcaneus.  No pain with palpation of the plantar aspect of the metatarsal heads.  No pain with MTPJ range of motion's.  Foot is in slightly cavus position.  Nails normal bilaterally. No open lesions are noted.     Assessment: Pes cavus with intrinsic muscle fatigue causing pain.     Plan:  - Pt seen and evaluated.  - Recommend that she try a pair of custom orthotics. She agrees to this. These were molded and sent to the lab. Will invert the mid and top layers for more comfort.   - She is moving to [place] in a month. Recommend following up with [facility] if needed.

## 2018-08-07 NOTE — NURSING NOTE
Reason For Visit:   Chief Complaint   Patient presents with     Consult     Pain, bilateral foot. Pt stated that the bottoms of her feet hurt. Pt stated that she was seen for plantar fasciitis back in [facility]. Pt stated that this is unrelated. Hx of sprained ankles.        Pain Assessment  Patient Currently in Pain: Yes (a little)  Primary Pain Location: Foot  Pain Orientation: Right, Left  Aggravating Factors: Walking, Standing (Pt stated that standing or walking for long periods of time trigger the pain. )               Current Outpatient Prescriptions   Medication Sig Dispense Refill     ALPRAZolam (XANAX) 0.5 MG tablet Take 0.5 mg by mouth       LamoTRIgine (LAMICTAL) 200 MG TB24 TAKE 2 TABLETS BY MOUTH EACH NIGHT AT BEDTIME  11     LAMOTRIGINE PO Take 200 mg by mouth daily       levonorgestrel (MIRENA, 52 MG,) 20 MCG/24HR IUD 1 each (20 mcg) by Intrauterine route once for 1 dose 1 each 0     sertraline (ZOLOFT) 100 MG tablet Take 1 tablet (100 mg) by mouth daily 90 tablet 3        No Known Allergies

## 2018-08-07 NOTE — MR AVS SNAPSHOT
After Visit Summary   8/7/2018    Sejal Marks    MRN: [id number]           Patient Information     Date Of Birth          1990        Visit Information        Provider Department      8/7/2018 11:40 AM [facility]        Today's Diagnoses     Pes cavus    -  1    Pain in both feet           Follow-ups after your visit        Additional Services     ORTHOTICS REFERRAL (Foot and Ankle)       Please be aware that coverage of these services is subject to the terms and limitations of your health insurance plan.  Call member services at your health plan with any benefit or coverage questions.      Please bring the following to your appointment:    >>   Any x-rays, CTs or MRIs which have been performed.  Contact the facility where they were done to arrange for  prior to your scheduled appointment.  Any new CT, MRI or other procedures ordered by your specialist must be performed at a [facility] facility or coordinated by your clinic's referral office.    >>   List of current medications   >>   This referral request   >>   Any documents/labs given to you for this referral    ==This Referral PRINTS in the [facility] (ORTHOTICS & PROSTHETICS) Central scheduling office ==     The [facility] Central Scheduling staff will contact patient to arrange appointments. Central Scheduling Phone #:  [place], [place]  [phone]     Orthotics: Foot Orthotics    FOOT AND ANKLE ORTHOTIC PRESCRIPTION:  Full Length Foot Orthotic:  Inverted mid and top layers.                  Who to contact     Please call your clinic at [phone] to:    Ask questions about your health    Make or cancel appointments    Discuss your medicines    Learn about your test results    Speak to your doctor            Additional Information About Your Visit        Dropifihart Information     [employer]are is an electronic gateway that provides easy, online access to your  medical records. With IOCS, you can request a clinic appointment, read your test results, renew a prescription or communicate with your care team.     To sign up for IOCS visit the website at [email or web address]   You will be asked to enter the access code listed below, as well as some personal information. Please follow the directions to create your username and password.     Your access code is: [email or web address]  Expires: 2018 11:05 AM     Your access code will  in 90 days. If you need help or a new code, please contact your [facility] or call [phone] for assistance.        Care EveryWhere ID     This is your Care EveryWhere ID. This could be used by other organizations to access your [facility] medical records  [id number]        Your Vitals Were     Last Period                   2018 (Exact Date)            Blood Pressure from Last 3 Encounters:   18 108/74   18 116/76   18 109/68    Weight from Last 3 Encounters:   18 65.2 kg (143 lb 12.8 oz)   18 64 kg (141 lb)   10/28/17 62.6 kg (138 lb)              We Performed the Following     ORTHOTICS REFERRAL (Foot and Ankle)        Primary Care Provider Office Phone # Fax #    Anne Ley [phone]       [facility] [address] [place] [address]        Equal Access to Services     [facility] HospitalLARS AH: Hadii nini ku hadasho Sowilian, waaxda luqadaha, qaybta kaalmada kyleighyada, thang ramirez. So [facility] [phone].    ATENCIÓN: Si habla español, tiene a muro disposición servicios gratuitos de asistencia lingüística. Llame al [phone].    We comply with applicable federal civil rights laws and [place] laws. We do not discriminate on the basis of race, color, national origin, age, disability, sex, sexual orientation, or gender identity.            Thank you!     Thank you for choosing [facility]  for  your care. Our goal is always to provide you with excellent care. Hearing back from our patients is one way we can continue to improve our services. Please take a few minutes to complete the written survey that you may receive in the mail after your visit with us. Thank you!             Your Updated Medication List - Protect others around you: Learn how to safely use, store and throw away your medicines at www.disposemymeds.org.          This list is accurate as of 8/7/18 12:43 PM.  Always use your most recent med list.                   Brand Name Dispense Instructions for use Diagnosis    ALPRAZolam 0.5 MG tablet    XANAX     Take 0.5 mg by mouth        * LAMOTRIGINE PO      Take 200 mg by mouth daily        * LamoTRIgine 200 MG Tb24    LaMICtal     TAKE 2 TABLETS BY MOUTH EACH NIGHT AT BEDTIME        levonorgestrel 20 MCG/24HR IUD    MIRENA (52 MG)    1 each    1 each (20 mcg) by Intrauterine route once for 1 dose    Encounter for IUD insertion       sertraline 100 MG tablet    ZOLOFT    90 tablet    Take 1 tablet (100 mg) by mouth daily    Major depressive disorder, recurrent episode, mild (H)       * Notice:  This list has 2 medication(s) that are the same as other medications prescribed for you. Read the directions carefully, and ask your doctor or other care provider to review them with you.